# Patient Record
Sex: MALE | Race: WHITE | NOT HISPANIC OR LATINO | Employment: FULL TIME | URBAN - METROPOLITAN AREA
[De-identification: names, ages, dates, MRNs, and addresses within clinical notes are randomized per-mention and may not be internally consistent; named-entity substitution may affect disease eponyms.]

---

## 2018-01-29 ENCOUNTER — COMMUNICATION - HEALTHEAST (OUTPATIENT)
Dept: SCHEDULING | Facility: CLINIC | Age: 38
End: 2018-01-29

## 2018-02-02 ENCOUNTER — OFFICE VISIT - HEALTHEAST (OUTPATIENT)
Dept: FAMILY MEDICINE | Facility: CLINIC | Age: 38
End: 2018-02-02

## 2018-02-02 ENCOUNTER — COMMUNICATION - HEALTHEAST (OUTPATIENT)
Dept: SCHEDULING | Facility: CLINIC | Age: 38
End: 2018-02-02

## 2018-02-02 DIAGNOSIS — R05.9 COUGH: ICD-10-CM

## 2018-02-02 DIAGNOSIS — J06.9 VIRAL URI: ICD-10-CM

## 2018-02-02 LAB
FLUAV AG SPEC QL IA: NORMAL
FLUBV AG SPEC QL IA: NORMAL

## 2018-02-02 RX ORDER — ENALAPRIL MALEATE 5 MG/1
TABLET ORAL
Status: SHIPPED | COMMUNITY
Start: 2018-01-19 | End: 2022-01-05

## 2018-02-02 RX ORDER — IBUPROFEN 200 MG
200 TABLET ORAL EVERY 6 HOURS PRN
Status: SHIPPED | COMMUNITY
Start: 2018-02-02

## 2018-02-02 RX ORDER — ROSUVASTATIN CALCIUM 40 MG/1
TABLET, COATED ORAL
Status: SHIPPED | COMMUNITY
Start: 2018-01-19 | End: 2022-01-05

## 2018-02-19 ENCOUNTER — COMMUNICATION - HEALTHEAST (OUTPATIENT)
Dept: FAMILY MEDICINE | Facility: CLINIC | Age: 38
End: 2018-02-19

## 2018-07-05 ENCOUNTER — COMMUNICATION - HEALTHEAST (OUTPATIENT)
Dept: FAMILY MEDICINE | Facility: CLINIC | Age: 38
End: 2018-07-05

## 2018-08-14 ENCOUNTER — COMMUNICATION - HEALTHEAST (OUTPATIENT)
Dept: FAMILY MEDICINE | Facility: CLINIC | Age: 38
End: 2018-08-14

## 2019-04-03 ENCOUNTER — AMBULATORY - HEALTHEAST (OUTPATIENT)
Dept: MULTI SPECIALTY CLINIC | Facility: CLINIC | Age: 39
End: 2019-04-03

## 2019-04-03 LAB — HBA1C MFR BLD: 7.7 % (ref 0–5.6)

## 2019-09-10 ENCOUNTER — COMMUNICATION - HEALTHEAST (OUTPATIENT)
Dept: TELEHEALTH | Facility: CLINIC | Age: 39
End: 2019-09-10

## 2019-09-10 ENCOUNTER — OFFICE VISIT - HEALTHEAST (OUTPATIENT)
Dept: FAMILY MEDICINE | Facility: CLINIC | Age: 39
End: 2019-09-10

## 2019-09-10 DIAGNOSIS — E10.8 TYPE 1 DIABETES MELLITUS WITH COMPLICATION (H): ICD-10-CM

## 2019-09-10 DIAGNOSIS — L02.92 RECURRENT BOILS: ICD-10-CM

## 2019-09-10 ASSESSMENT — MIFFLIN-ST. JEOR: SCORE: 1840.2

## 2019-09-10 NOTE — ASSESSMENT & PLAN NOTE
CURRENT ACUTE BOIL  Right abdomen just above pubic hairline reveals a 8mm oval area of ulceration with surrounding subacute appearing area of erythema. With palpation I can feel a 4 cm diameter area of induration subcutaneously which does not feel fluctuant.     He has one other boils which is healing, and currently appears subacute and nearly resolved.     Recommend warm packing four times a day x 15 min each time and I will rx augmentin.   Follow in 48 hours if not resolving as expected.

## 2019-09-10 NOTE — ASSESSMENT & PLAN NOTE
Controlled A1c was noted to be seven-point 7/3/2019.  He sees Dr. Gonzalez for his diabetes and is due for a follow-up which he is aware of and plans to schedule soon.

## 2020-01-16 ENCOUNTER — COMMUNICATION - HEALTHEAST (OUTPATIENT)
Dept: FAMILY MEDICINE | Facility: CLINIC | Age: 40
End: 2020-01-16

## 2020-01-16 ENCOUNTER — COMMUNICATION - HEALTHEAST (OUTPATIENT)
Dept: SCHEDULING | Facility: CLINIC | Age: 40
End: 2020-01-16

## 2020-01-16 DIAGNOSIS — Z20.828 EXPOSURE TO INFLUENZA: ICD-10-CM

## 2020-01-16 DIAGNOSIS — E10.9 TYPE 1 DIABETES MELLITUS WITHOUT COMPLICATION (H): ICD-10-CM

## 2021-05-31 VITALS — WEIGHT: 212 LBS

## 2021-06-03 VITALS
BODY MASS INDEX: 29.63 KG/M2 | HEART RATE: 72 BPM | HEIGHT: 70 IN | DIASTOLIC BLOOD PRESSURE: 78 MMHG | WEIGHT: 207 LBS | SYSTOLIC BLOOD PRESSURE: 130 MMHG

## 2021-06-05 NOTE — TELEPHONE ENCOUNTER
Type I diabetic.  If his kids do indeed have influenza, he is a candidate for Tamiflu.  I would actually recommend that his children get evaluated as it will clarify this question.  I prescribed a prophylactic dosing of Tamiflu

## 2021-06-05 NOTE — TELEPHONE ENCOUNTER
Pt is calling in to see if he should have Tamiflu prescribed, he denies any symptoms of the flu, but has been caring for his 2 sick children who have had fever, chills, coughing, and headache since Tuesday. Children have not been diagnosed with Influenza. However, he is a Type 2 diabetic.   Per protocol pt should receive a call back from the clinic.   Please call Jakob at [phone].    Provider please advise.    Jamar Galeano RN [facility] Triage/Medication Refill    Reason for Disposition    HIGH RISK (e.g., age > 64 years, pregnant, HIV+, chronic medical condition) and flu symptoms    Protocols used: INFLUENZA - SEASONAL-A-[place]

## 2021-06-15 NOTE — PROGRESS NOTES
Assessment/Plan:      Visit Diagnoses     Viral URI    -  Primary      Discussed probable viral etiology.  Continue symptomatic treatment.  Follow-up if not improving.    The patient is also asking if he could establish with a primary care provider here at the clinic.  He would like to see Dr. Laguna as that is who he sees the remainder of his family.  He is going to schedule an appointment with Dr. Laguna for a physical.    Patient Instructions   Suspect probable viral etiology.    1) Over the counter cold medication as needed.  2) Ibuprofen every 6 hours or Tylenol every 4 hours as needed for fever/discomfort.  3) Follow up in 7-10 days if not improving, sooner if worsening or other concerns.           Subjective:   Jakob Padilla is a 37 y.o. male who presents today complaining of cough, congestion, fever, and general malaise for about 4 days.  Fever and sweats started about two days ago.  His wife had a similar illness last week and is improving.  The patient has Type 1 diabetes.  His blood sugars have been stable, 115-180.  The patient is not a smoker (quit 5 years ago) and has no chronic lung disease.  No wheezing with this illness.     Patient Active Problem List   Diagnosis     Dyslipidemia     Hypertension     Proteinuria     Myalgia And Myositis     Lower Back Pain     Type 1 Diabetes Mellitus     Pain In The Hands        No past medical history on file.     No past surgical history on file.       Current Outpatient Prescriptions:      ibuprofen (ADVIL,MOTRIN) 200 MG tablet, Take 200 mg by mouth every 6 (six) hours as needed for pain., Disp: , Rfl:      enalapril (VASOTEC) 5 MG tablet, , Disp: , Rfl:      HUMALOG 100 unit/mL injection, USE UP  UNITS DAILY, Disp: , Rfl: 1     rosuvastatin (CRESTOR) 40 MG tablet, , Disp: , Rfl:       Review of Systems     Objective:     Vitals:    02/02/18 1400   BP: 124/80   Patient Site: Left Arm   Patient Position: Sitting   Cuff Size: Adult Regular   Pulse: 76    Resp: 16   Temp: 98.5  F (36.9  C)   TempSrc: Oral   SpO2: 98%   Weight: 212 lb (96.2 kg)        Physical Exam   Constitutional: He appears well-developed and well-nourished.   HENT:   Right Ear: Ear canal normal. Tympanic membrane is not erythematous, not retracted and not bulging. A middle ear effusion is present.   Left Ear: Ear canal normal. Tympanic membrane is not erythematous, not retracted and not bulging. A middle ear effusion is present.   Mouth/Throat: Oropharynx is clear and moist.   Eyes: Conjunctivae are normal.   Neck: Normal range of motion. Neck supple.   Cardiovascular: Normal rate and regular rhythm.    No murmur heard.  Pulmonary/Chest: Effort normal and breath sounds normal. No respiratory distress. He has no wheezes.   Lymphadenopathy:     He has no cervical adenopathy.     Results for orders placed or performed in visit on 02/02/18   Influenza A/B Rapid Test   Result Value Ref Range    Influenza  A, Rapid Antigen No Influenza A antigen detected No Influenza A antigen detected    Influenza B, Rapid Antigen No Influenza B antigen detected No Influenza B antigen detected

## 2021-06-16 PROBLEM — L02.92 RECURRENT BOILS: Status: ACTIVE | Noted: 2019-09-10

## 2021-06-20 NOTE — LETTER
Letter by Leonel Laguna DO at      Author: Leonel Laguna DO Service: -- Author Type: --    Filed:  Encounter Date: 1/16/2020 Status: Signed         Jakob Padilla   [address]  [facility] [address]      1/16/2020       Dear Jakob,       In reviewing your records, we have determined a gap in your preventive services. Based on your age and health history, we recommend the follow service.     X    Diabetic Eye Exam- These are needed yearly from our diabetics. We ask that you inform your Eye doctor to please send their notes to us at the clinic.    If you have had the service elsewhere, please contact us so we can update our records. Please let us know if you have transferred your care to another clinic.    Please call [phone] to schedule this appointment.    We believe that a strong preventive care program, including regular physicals and follow-up care is an important part of a healthy lifestyle and we are committed to helping you maintain your health.    Thank you for choosing us as your health care provider.    Sincerely,     [facility]

## 2021-06-28 NOTE — PROGRESS NOTES
Progress Notes by Fay Quintana MD at 9/10/2019 11:40 AM     Author: Fay Quintana MD Service: -- Author Type: Physician    Filed: 9/10/2019 12:28 PM Encounter Date: 9/10/2019 Status: Signed    : Fay Quintana MD (Physician)       ASSESSMENT AND PLAN:     Problem List Items Addressed This Visit        Unprioritized    Type 1 diabetes mellitus (H)     Controlled A1c was noted to be seven-point 7/3/2019.  He sees Dr. Gonzalez for his diabetes and is due for a follow-up which he is aware of and plans to schedule soon.         Recurrent boils - Primary     CURRENT ACUTE BOIL  Right abdomen just above pubic hairline reveals a 8mm oval area of ulceration with surrounding subacute appearing area of erythema. With palpation I can feel a 4 cm diameter area of induration subcutaneously which does not feel fluctuant.     He has one other boils which is healing, and currently appears subacute and nearly resolved.     Recommend warm packing four times a day x 15 min each time and I will rx augmentin.   Follow in 48 hours if not resolving as expected.          Relevant Medications    amoxicillin-clavulanate (AUGMENTIN) 875-125 mg per tablet           Chief Complaint   Patient presents with   ? infected skin lesion     on belt line, today a lot pain.started Wed/Thurs..getting worse        HPI  Jakob Padilla is a 39 y.o. male has type 1 diabetes with an A1c most recently of 7.7 on 4/3/2019.  He is managed in terms of his diabetes by Dr. Gonzalez and is due for a follow-up visit soon.  He comes in today because he tells me he gets recurrent boils on his abdominal skin.  Usually they heal up on their own with Epsom salts baths and occasionally he uses some ibuprofen.    Today he comes in because he feels like this boil is a little bit worse than it usually gets for him and it actually woke him up today.  He had shooting pain down his right leg but all that has resolved now after he did an Epsom salt bath this  "morning and took 400 mg of ibuprofen. He is actually feeling quite well right now.  However he wanted to come in and get it checked out because it seems like it is worse than it usually gets.    Social History     Tobacco Use   Smoking Status Former Smoker   ? Last attempt to quit:    ? Years since quittin.6   Smokeless Tobacco Never Used      Current Outpatient Medications on File Prior to Visit   Medication Sig Dispense Refill   ? CONTOUR NEXT TEST STRIPS strips TEST 4-6 TIMES PER DAY. PATIENT IS NOW ON A MEDTRONIC PUMP THAT ONLY USES THE [facility] METER  3   ? enalapril (VASOTEC) 5 MG tablet      ? HUMALOG 100 unit/mL injection USE UP  UNITS DAILY BRAND NAME ONLY  1   ? ibuprofen (ADVIL,MOTRIN) 200 MG tablet Take 200 mg by mouth every 6 (six) hours as needed for pain.     ? rosuvastatin (CRESTOR) 40 MG tablet        No current facility-administered medications on file prior to visit.       No Known Allergies      Review of Systems   Constitutional: Negative.    HENT: Negative.    Eyes: Negative.    Respiratory: Negative.    Cardiovascular: Negative.    Gastrointestinal: Negative.    Endocrine: Negative.    Genitourinary: Negative.    Musculoskeletal: Negative.    Skin: Positive for wound.        See hpi   Neurological: Negative.    Hematological: Negative.    Psychiatric/Behavioral: Negative.         OBJECTIVE: /78 (Patient Site: Left Arm, Patient Position: Sitting, Cuff Size: Adult Large)   Pulse 72   Ht 5' 10\" (1.778 m)   Wt 207 lb (93.9 kg)   BMI 29.70 kg/m     Physical Exam   Constitutional: He is oriented to person, place, and time. He appears well-developed and well-nourished. No distress.   HENT:   Head: Normocephalic and atraumatic.   Eyes: Conjunctivae are normal.   Neck: Neck supple.   Cardiovascular: Normal rate, regular rhythm and normal heart sounds.   Pulmonary/Chest: Effort normal and breath sounds normal.   Abdominal: Soft. Bowel sounds are normal.   Musculoskeletal: Normal " range of motion.   Neurological: He is alert and oriented to person, place, and time.   Skin: Skin is warm and dry.        Right abdomen just above pubic hairline reveals a 8mm oval area of ulceration with surrounding subacute appearing area of erythema. With palpation I can feel a 4 cm diameter area of induration subcutaneously which does not feel fluctuant.    Psychiatric: He has a normal mood and affect.        Additional History from Old Records Summarized (2): no  Decision to Obtain Records (1): yes A1C from [facility] obtained through [facility].   Radiology Tests Summarized or Ordered (1): no  Labs Reviewed or Ordered (1): yes  Medicine Test Summarized or Ordered (1): yes  Independent Review of EKG or X-RAY(2 each): no    This note was created using Dragon dictation.  Please excuse any grammatical errors.

## 2021-06-30 ENCOUNTER — RECORDS - HEALTHEAST (OUTPATIENT)
Dept: ADMINISTRATIVE | Facility: OTHER | Age: 41
End: 2021-06-30

## 2021-08-27 ENCOUNTER — TRANSFERRED RECORDS (OUTPATIENT)
Dept: HEALTH INFORMATION MANAGEMENT | Facility: CLINIC | Age: 41
End: 2021-08-27

## 2022-01-05 ENCOUNTER — NURSE TRIAGE (OUTPATIENT)
Dept: NURSING | Facility: CLINIC | Age: 42
End: 2022-01-05

## 2022-01-05 ENCOUNTER — OFFICE VISIT (OUTPATIENT)
Dept: FAMILY MEDICINE | Facility: CLINIC | Age: 42
End: 2022-01-05
Payer: COMMERCIAL

## 2022-01-05 VITALS
SYSTOLIC BLOOD PRESSURE: 132 MMHG | OXYGEN SATURATION: 99 % | HEIGHT: 70 IN | BODY MASS INDEX: 32.64 KG/M2 | HEART RATE: 80 BPM | RESPIRATION RATE: 16 BRPM | DIASTOLIC BLOOD PRESSURE: 80 MMHG | TEMPERATURE: 98 F | WEIGHT: 228 LBS

## 2022-01-05 DIAGNOSIS — M79.89 SWELLING OF LEFT HAND: Primary | ICD-10-CM

## 2022-01-05 PROCEDURE — 99214 OFFICE O/P EST MOD 30 MIN: CPT | Performed by: FAMILY MEDICINE

## 2022-01-05 RX ORDER — METHYLPREDNISOLONE 4 MG
TABLET, DOSE PACK ORAL
Qty: 21 TABLET | Refills: 0 | Status: SHIPPED | OUTPATIENT
Start: 2022-01-05

## 2022-01-05 ASSESSMENT — MIFFLIN-ST. JEOR: SCORE: 1935.45

## 2022-01-05 NOTE — TELEPHONE ENCOUNTER
Few days swelling left hand back, nuckel , icing it    possible injury from dog play/or when taking out tree    Needs to be seen    Warm transferred to scheduling    Clarice Chester RN  [facility] Nurse Advisor  Reason for Disposition    Injury interferes with work or school    Additional Information    Negative: Major bleeding (actively dripping or spurting) that can't be stopped    Negative: Amputation or bone sticking through the skin    Negative: Serious injury with multiple fractures (broken bones)    Negative: Sounds like a life-threatening emergency to the triager    Negative: Finger injury is main concern    Negative: Caused by an animal bite    Negative: Wound looks infected    Negative: Cast problems or questions    Negative: Bullet, stabbed by knife or other serious penetrating wound    Negative: High pressure injection injury (e.g., from paint gun, usually work-related)    Negative: Looks like a broken bone or dislocated joint (crooked or deformed)    Negative: Skin is split open or gaping (length > 1/2 inch or 12 mm)    Negative: Bleeding won't stop after 10 minutes of direct pressure (using correct technique)    Negative: Dirt in the wound and not removed after 15 minutes of scrubbing    Negative: Numbness (loss of sensation) of finger(s)    Negative: Sounds like a serious injury to the triager    Negative: Looks infected (e.g., spreading redness, pus, red streak)    Negative: SEVERE pain (e.g., excruciating)    Negative: Large swelling or bruise and size > palm of person's hand    Negative: No prior tetanus shots (or is not fully vaccinated) and any wound (e.g., cut or scrape)    Negative: HIV positive or severe immunodeficiency (severely weak immune system) and DIRTY cut or scrape    Negative: Patient wants to be seen    Protocols used: HAND AND WRIST INJURY-A-OH

## 2022-01-05 NOTE — ASSESSMENT & PLAN NOTE
Dorsum of left hand swollen.  Unclear trigger.  Possible causes could be inflammation as result of playing baseball, injury while moving a [place] tree or a mild bite or saliva from a dog.  Exam is not consistent with infection either cellulitis or abscess.  No induration.  No past episodes.  For now we will think of this is inflammatory action.  Symptoms overall are stable/improved today in comparison to yesterday.  If not improving will consider Medrol Dosepak.  This was sent to the pharmacy.  If he has worsening symptoms overnight he will start tomorrow and watch his blood sugars closely.  If he does develop symptoms of redness, pain to the touch or warmth he will contact us and we will treat for cellulitis with Keflex.   -Blood sugar control adequate.  Type 1 diabetes unlikely to be contributory.  -No asplenia given contact with dog saliva.

## 2022-01-05 NOTE — PROGRESS NOTES
Assessment/Plan:    Swelling of left hand  Dorsum of left hand swollen.  Unclear trigger.  Possible causes could be inflammation as result of playing baseball, injury while moving a [place] tree or a mild bite or saliva from a dog.  Exam is not consistent with infection either cellulitis or abscess.  No induration.  No past episodes.  For now we will think of this is inflammatory action.  Symptoms overall are stable/improved today in comparison to yesterday.  If not improving will consider Medrol Dosepak.  This was sent to the pharmacy.  If he has worsening symptoms overnight he will start tomorrow and watch his blood sugars closely.  If he does develop symptoms of redness, pain to the touch or warmth he will contact us and we will treat for cellulitis with Keflex.   -Blood sugar control adequate.  Type 1 diabetes unlikely to be contributory.  -No asplenia given contact with dog saliva.     Return in about 1 week (around 1/12/2022) for Recheck if not improving.    Yuniel Cuenca MD  _______________________________    Chief Complaint   Patient presents with     Musculoskeletal Problem     top of left hand x 2 days      Subjective: Jakob Padilla is a 41 year old year old male who I have not seen in clinic before who presents with the following acute complaint(s):    Hand pain / swelling:   - not sure how he exactly injured himself.  He played with his dog.  He also bumped his hand on a door.  He played baseball a few nights ago.  Two skin bumps.  Wonders about infection. Swelling not going down. No previous episodes.  3-4 months ago he had trigger finger release surgery (still in recovery).  No obvious trauma.  Health history includes DM-I, skin boils,     Review of Systems   Constitutional:        Review of systems negative except as noted in the HPI.   All other systems reviewed and are negative.     Histories reviewed:    Problems  Med Hx  Surg Hx         Objective:   /80 (BP Location: Left arm,  "Patient Position: Chair, Cuff Size: Adult Large)   Pulse 80   Temp 98  F (36.7  C) (Oral)   Resp 16   Ht 1.778 m (5' 10\")   Wt 103.4 kg (228 lb)   SpO2 99%   BMI 32.71 kg/m    Physical Exam  Nursing note reviewed.   Constitutional:       General: He is not in acute distress.     Appearance: Normal appearance. He is not ill-appearing.   HENT:      Head: Normocephalic and atraumatic.   Eyes:      Extraocular Movements: Extraocular movements intact.      Conjunctiva/sclera: Conjunctivae normal.   Pulmonary:      Effort: Pulmonary effort is normal.   Musculoskeletal:      Comments: Dorsum of left hand is puffy without redness, warmth.  Mildly tender over the MCP joints.  Palmar aspect is nontender.  No limitation of movement.   Neurological:      Mental Status: He is alert and oriented to person, place, and time.   Psychiatric:         Attention and Perception: Attention normal.         Mood and Affect: Mood normal.         Speech: Speech normal.         Thought Content: Thought content normal.         No results found for this or any previous visit (from the past 48 hour(s)).  No results found for this visit on 01/05/22.    This note has been dictated using voice recognition software. Any grammatical or context distortions are unintentional and inherent to the software    "

## 2023-12-05 ENCOUNTER — TRANSFERRED RECORDS (OUTPATIENT)
Dept: HEALTH INFORMATION MANAGEMENT | Facility: CLINIC | Age: 43
End: 2023-12-05
Payer: COMMERCIAL